# Patient Record
Sex: MALE | Race: WHITE | NOT HISPANIC OR LATINO
[De-identification: names, ages, dates, MRNs, and addresses within clinical notes are randomized per-mention and may not be internally consistent; named-entity substitution may affect disease eponyms.]

---

## 2021-05-04 ENCOUNTER — APPOINTMENT (OUTPATIENT)
Dept: UROLOGY | Facility: CLINIC | Age: 66
End: 2021-05-04
Payer: MEDICARE

## 2021-05-04 VITALS — TEMPERATURE: 97.7 F

## 2021-05-04 DIAGNOSIS — R35.0 FREQUENCY OF MICTURITION: ICD-10-CM

## 2021-05-04 DIAGNOSIS — N13.8 BENIGN PROSTATIC HYPERPLASIA WITH LOWER URINARY TRACT SYMPMS: ICD-10-CM

## 2021-05-04 DIAGNOSIS — N40.1 BENIGN PROSTATIC HYPERPLASIA WITH LOWER URINARY TRACT SYMPMS: ICD-10-CM

## 2021-05-04 PROCEDURE — 99204 OFFICE O/P NEW MOD 45 MIN: CPT

## 2022-05-10 ENCOUNTER — APPOINTMENT (OUTPATIENT)
Dept: UROLOGY | Facility: CLINIC | Age: 67
End: 2022-05-10

## 2023-02-16 ENCOUNTER — APPOINTMENT (OUTPATIENT)
Dept: ORTHOPEDIC SURGERY | Facility: CLINIC | Age: 68
End: 2023-02-16
Payer: MEDICARE

## 2023-02-16 VITALS — WEIGHT: 162 LBS | BODY MASS INDEX: 25.43 KG/M2 | HEIGHT: 67 IN

## 2023-02-16 DIAGNOSIS — Z78.9 OTHER SPECIFIED HEALTH STATUS: ICD-10-CM

## 2023-02-16 PROCEDURE — 99203 OFFICE O/P NEW LOW 30 MIN: CPT

## 2023-02-16 PROCEDURE — 73610 X-RAY EXAM OF ANKLE: CPT | Mod: RT

## 2023-02-16 NOTE — PHYSICAL EXAM
[Right] : right foot and ankle [NL (40)] : plantar flexion 40 degrees [] : no calf tenderness [TWNoteComboBox7] : dorsiflexion 10 degrees

## 2023-02-16 NOTE — DISCUSSION/SUMMARY
[de-identified] : Discussed bracing/shoe modification\par Consider viscosupplementation\par May ultimately require taa\par Rheumatology f/u

## 2023-02-16 NOTE — HISTORY OF PRESENT ILLNESS
[6] : 6 [3] : 3 [Dull/Aching] : dull/aching [Localized] : localized [Sharp] : sharp [Retired] : Work status: retired [de-identified] : 2/16/23: 68 yo male with right ankle pain for years but worse past few months. He has pain with walking and exercise. He tried to rest for about 3 weeks. He saw Dr. Charlton March 2022 and diagnosed with ankle OA. He had CSI by rheumatologist in Dec 2022 with temporary relief.\par \par +RF [] : Post Surgical Visit: no [FreeTextEntry1] : R ankle [FreeTextEntry3] : N/A Chronic [FreeTextEntry5] : 68 Y/O RHD M eval R ankle. pt presents with atraumatic chronic pain associated with overuse during activity. prior TX of CSI in 12/2022 with some short term relief.  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

## 2023-02-16 NOTE — REVIEW OF SYSTEMS
Mid-Level Procedure Text (A): After obtaining clear surgical margins the patient was sent to a mid-level provider for surgical repair.  The patient understands they will receive post-surgical care and follow-up from the mid-level provider. [Joint Pain] : joint pain [Joint Stiffness] : joint stiffness [Joint Swelling] : joint swelling

## 2023-03-16 ENCOUNTER — APPOINTMENT (OUTPATIENT)
Dept: ORTHOPEDIC SURGERY | Facility: CLINIC | Age: 68
End: 2023-03-16
Payer: MEDICARE

## 2023-03-16 VITALS — BODY MASS INDEX: 25.43 KG/M2 | HEIGHT: 67 IN | WEIGHT: 162 LBS

## 2023-03-16 PROCEDURE — 99212 OFFICE O/P EST SF 10 MIN: CPT

## 2023-03-16 NOTE — HISTORY OF PRESENT ILLNESS
[6] : 6 [3] : 3 [Dull/Aching] : dull/aching [Localized] : localized [Sharp] : sharp [] : This patient has had an injection before: yes [1] : 1 [Other:____] : [unfilled] [de-identified] : 2/16/23: 66 yo male with right ankle pain for years but worse past few months. He has pain with walking and exercise. He tried to rest for about 3 weeks. He saw Dr. Charlton March 2022 and diagnosed with ankle OA. He had CSI by rheumatologist in Dec 2022 with temporary relief.\par +RF\par \par 3/16/23: R ankle visco three #1 [FreeTextEntry1] : R ankle [de-identified] : no

## 2023-03-16 NOTE — DISCUSSION/SUMMARY
[de-identified] : Discussed bracing/shoe modification\par May ultimately require taa\par Rheumatology f/u\par visco three # 1 tolerated well

## 2023-03-21 ENCOUNTER — APPOINTMENT (OUTPATIENT)
Dept: ORTHOPEDIC SURGERY | Facility: CLINIC | Age: 68
End: 2023-03-21
Payer: MEDICARE

## 2023-03-21 PROCEDURE — 99024 POSTOP FOLLOW-UP VISIT: CPT

## 2023-03-21 NOTE — PROCEDURE
[Medium Joint Injection] : medium joint injection [Right] : of the right [Ankle Joint] : ankle joint [Pain] : pain [Inflammation] : inflammation [X-ray evidence of Osteoarthritis on this or prior visit] : x-ray evidence of Osteoarthritis on this or prior visit [Alcohol] : alcohol [Ethyl Chloride sprayed topically] : ethyl chloride sprayed topically [Sterile technique used] : sterile technique used [Visco-3 (25mg)] : 25mg of Visco-3 [] : Patient tolerated procedure well [Call if redness, pain or fever occur] : call if redness, pain or fever occur [Apply ice for 15min out of every hour for the next 12-24 hours as tolerated] : apply ice for 15 minutes out of every hour for the next 12-24 hours as tolerated [Patient was advised to rest the joint(s) for ____ days] : patient was advised to rest the joint(s) for [unfilled] days [Previous OTC use and PT nontherapeutic] : patient has tried OTC's including aspirin, Ibuprofen, Aleve, etc or prescription NSAIDS, and/or exercises at home and/or physical therapy without satisfactory response [Patient had decreased mobility in the joint] : patient had decreased mobility in the joint [Risks, benefits, alternatives discussed / Verbal consent obtained] : the risks benefits, and alternatives have been discussed, and verbal consent was obtained [#2] : series #2

## 2023-03-21 NOTE — HISTORY OF PRESENT ILLNESS
[6] : 6 [3] : 3 [Dull/Aching] : dull/aching [Localized] : localized [Sharp] : sharp [Leisure] : leisure [Rest] : rest [Walking] : walking [2] : 2 [Other:____] : [unfilled] [de-identified] : 2/16/23: 68 yo male with right ankle pain for years but worse past few months. He has pain with walking and exercise. He tried to rest for about 3 weeks. He saw Dr. Charlton March 2022 and diagnosed with ankle OA. He had CSI by rheumatologist in Dec 2022 with temporary relief.\par +RF\par \par 3/16/23: R ankle visco3 #1\par 3/21/23: R ankle Visco3 #2 notes some improvement [] : Post Surgical Visit: no [FreeTextEntry1] : R ankle [de-identified] : no [de-identified] : 3/16/23

## 2023-03-30 ENCOUNTER — APPOINTMENT (OUTPATIENT)
Dept: ORTHOPEDIC SURGERY | Facility: CLINIC | Age: 68
End: 2023-03-30
Payer: MEDICARE

## 2023-03-30 VITALS — BODY MASS INDEX: 25.43 KG/M2 | HEIGHT: 67 IN | WEIGHT: 162 LBS

## 2023-03-30 PROCEDURE — 99024 POSTOP FOLLOW-UP VISIT: CPT

## 2023-03-30 NOTE — PROCEDURE
[Medium Joint Injection] : medium joint injection [Right] : of the right [Ankle Joint] : ankle joint [Pain] : pain [Inflammation] : inflammation [X-ray evidence of Osteoarthritis on this or prior visit] : x-ray evidence of Osteoarthritis on this or prior visit [Alcohol] : alcohol [Ethyl Chloride sprayed topically] : ethyl chloride sprayed topically [Sterile technique used] : sterile technique used [Visco-3 (25mg)] : 25mg of Visco-3 [] : Patient tolerated procedure well [Call if redness, pain or fever occur] : call if redness, pain or fever occur [Apply ice for 15min out of every hour for the next 12-24 hours as tolerated] : apply ice for 15 minutes out of every hour for the next 12-24 hours as tolerated [Patient was advised to rest the joint(s) for ____ days] : patient was advised to rest the joint(s) for [unfilled] days [Previous OTC use and PT nontherapeutic] : patient has tried OTC's including aspirin, Ibuprofen, Aleve, etc or prescription NSAIDS, and/or exercises at home and/or physical therapy without satisfactory response [Patient had decreased mobility in the joint] : patient had decreased mobility in the joint [Risks, benefits, alternatives discussed / Verbal consent obtained] : the risks benefits, and alternatives have been discussed, and verbal consent was obtained [#3] : series #3

## 2023-03-30 NOTE — HISTORY OF PRESENT ILLNESS
[6] : 6 [3] : 3 [Dull/Aching] : dull/aching [Localized] : localized [Sharp] : sharp [Leisure] : leisure [Rest] : rest [Walking] : walking [2] : 2 [Other:____] : [unfilled] [de-identified] : 2/16/23: 66 yo male with right ankle pain for years but worse past few months. He has pain with walking and exercise. He tried to rest for about 3 weeks. He saw Dr. Charlton March 2022 and diagnosed with ankle OA. He had CSI by rheumatologist in Dec 2022 with temporary relief.\par +RF\par \par 3/16/23: R ankle visco3 #1\par 3/21/23: R ankle Visco3 #2 notes some improvement\par 3/30/23 R ankle Visco3 #3  sl improvement [] : no [FreeTextEntry1] : R ankle [de-identified] : no [de-identified] : 3/16/23

## 2023-05-11 ENCOUNTER — APPOINTMENT (OUTPATIENT)
Dept: ORTHOPEDIC SURGERY | Facility: CLINIC | Age: 68
End: 2023-05-11
Payer: MEDICARE

## 2023-05-11 VITALS — HEIGHT: 67 IN | BODY MASS INDEX: 25.43 KG/M2 | WEIGHT: 162 LBS

## 2023-05-11 DIAGNOSIS — R76.8 OTHER SPECIFIED ABNORMAL IMMUNOLOGICAL FINDINGS IN SERUM: ICD-10-CM

## 2023-05-11 PROCEDURE — 20605 DRAIN/INJ JOINT/BURSA W/O US: CPT | Mod: RT

## 2023-05-11 PROCEDURE — 99214 OFFICE O/P EST MOD 30 MIN: CPT | Mod: 25

## 2023-05-11 NOTE — PROCEDURE
[Medium Joint Injection] : medium joint injection [Right] : of the right [Ankle Joint] : ankle joint [Alcohol] : alcohol [Ethyl Chloride sprayed topically] : ethyl chloride sprayed topically [Sterile technique used] : sterile technique used [___ cc    12mg] :  Betamethasone (Celestone) ~Vcc of 12mg [___ cc    2%] : Lidocaine ~Vcc of 2%  [___ cc    0.5%] : Bupivacaine (Marcaine) ~Vcc of 0.5%  [] : Patient tolerated procedure well [Call if redness, pain or fever occur] : call if redness, pain or fever occur [Apply ice for 15min out of every hour for the next 12-24 hours as tolerated] : apply ice for 15 minutes out of every hour for the next 12-24 hours as tolerated [Patient was advised to rest the joint(s) for ____ days] : patient was advised to rest the joint(s) for [unfilled] days [Previous OTC use and PT nontherapeutic] : patient has tried OTC's including aspirin, Ibuprofen, Aleve, etc or prescription NSAIDS, and/or exercises at home and/or physical therapy without satisfactory response [Patient had decreased mobility in the joint] : patient had decreased mobility in the joint [Risks, benefits, alternatives discussed / Verbal consent obtained] : the risks benefits, and alternatives have been discussed, and verbal consent was obtained

## 2023-05-11 NOTE — HISTORY OF PRESENT ILLNESS
[6] : 6 [3] : 3 [Dull/Aching] : dull/aching [Sharp] : sharp [Leisure] : leisure [Rest] : rest [Walking] : walking [2] : 2 [Other:____] : [unfilled] [de-identified] : 2/16/23: 68 yo male with right ankle pain for years but worse past few months. He has pain with walking and exercise. He tried to rest for about 3 weeks. He saw Dr. Charlton March 2022 and diagnosed with ankle OA. He had CSI by rheumatologist in Dec 2022 with temporary relief.\par +RF\par \par 3/16/23: R ankle visco3 #1\par 3/21/23: R ankle Visco3 #2 notes some improvement\par 3/30/23 R ankle Visco3 #3  sl improvement\par 05/11/2023: Follow up right ankle. States pain has stayed the same. requesting CSI [] : Post Surgical Visit: no [FreeTextEntry1] : R ankle [FreeTextEntry6] : soreness, swelling  [de-identified] : no [de-identified] : 3/16/23

## 2023-06-21 ENCOUNTER — APPOINTMENT (OUTPATIENT)
Dept: ORTHOPEDIC SURGERY | Facility: CLINIC | Age: 68
End: 2023-06-21
Payer: MEDICARE

## 2023-06-21 VITALS — WEIGHT: 162 LBS | BODY MASS INDEX: 25.43 KG/M2 | HEIGHT: 67 IN

## 2023-06-21 DIAGNOSIS — I10 ESSENTIAL (PRIMARY) HYPERTENSION: ICD-10-CM

## 2023-06-21 DIAGNOSIS — Z00.00 ENCOUNTER FOR GENERAL ADULT MEDICAL EXAMINATION W/OUT ABNORMAL FINDINGS: ICD-10-CM

## 2023-06-21 DIAGNOSIS — M19.071 PRIMARY OSTEOARTHRITIS, RIGHT ANKLE AND FOOT: ICD-10-CM

## 2023-06-21 PROCEDURE — 99214 OFFICE O/P EST MOD 30 MIN: CPT

## 2023-06-21 RX ORDER — METOPROLOL TARTRATE 75 MG/1
TABLET, FILM COATED ORAL
Refills: 0 | Status: ACTIVE | COMMUNITY

## 2023-06-21 NOTE — ASSESSMENT
[FreeTextEntry1] : Discussed treatment options, both operative and non-operative.\par He has tried conservative management without significant relief.\par Custom bracing has not been attempted and this was discussed.\par \par Patient was discussed multiple forms of both conservative (ie.injections, bracing) and surgical (ankle replacement vs. ankle arthrodesis).  He is a good candidate for a total ankle replacement. Risks and benefits of all options were discussed in detail.  Risks of arthrodesis include adjacent joint degeneration.  Risks of total ankle replacement include the need for possible revision surgery in the future. \par \par The risks and benefits of surgery have been discussed. Risks include but are not limited to bleeding, infection, reaction to anesthesia, injury to blood vessels and nerves, malunion, nonunion, necessity of repeat surgery, chronic pain, DVT, PE, loss of limb and death. The patient understands the risks and agrees with the surgical plan. Details of the procedure and postoperative protocol were discussed at length.\par \par All questions were answered. \par \par He would like to think about his options. \par \par \par \par

## 2023-06-21 NOTE — PHYSICAL EXAM
[Right] : right foot and ankle [Mild] : mild diffused ankle swelling [NL (40)] : plantar flexion 40 degrees [NL 30)] : inversion 30 degrees [NL (20)] : eversion 20 degrees [4___] : plantar flexion 4[unfilled]/5 [5___] : eversion 5[unfilled]/5 [2+] : posterior tibialis pulse: 2+ [Normal] : saphenous nerve sensation normal [] : patient ambulates without assistive device [TWNoteComboBox7] : dorsiflexion 10 degrees

## 2023-06-21 NOTE — DATA REVIEWED
[Outside X-rays] : outside x-rays [Right] : of the right [Ankle] : ankle [I independently reviewed and interpreted images and report] : I independently reviewed and interpreted images and report [FreeTextEntry1] : [facility] February 2023: Tibiotalar joint degenerative changes, worse when compared to March 2022 films.

## 2023-06-21 NOTE — HISTORY OF PRESENT ILLNESS
[Tightness] : tightness [de-identified] : Pt is a 68 year old male who presents today for evaluation of his right ankle. Pt states that he's been having ankle pain for years, getting worse recently. He has tried NSAIDS, steroid injection and visco series without significant relief. WB in supportive sneakers, neoprene sleeve. Pain is daily and prevents him from walking for exercise. \par \par   [FreeTextEntry6] : stiffness, soreness

## 2023-08-29 RX ORDER — SILDENAFIL 100 MG/1
100 TABLET, FILM COATED ORAL
Qty: 20 | Refills: 7 | Status: ACTIVE | COMMUNITY
Start: 2023-08-29 | End: 1900-01-01